# Patient Record
Sex: MALE | Race: WHITE | Employment: FULL TIME | URBAN - METROPOLITAN AREA
[De-identification: names, ages, dates, MRNs, and addresses within clinical notes are randomized per-mention and may not be internally consistent; named-entity substitution may affect disease eponyms.]

---

## 2019-04-10 ENCOUNTER — ANESTHESIA EVENT (OUTPATIENT)
Dept: OPERATING ROOM | Age: 49
DRG: 343 | End: 2019-04-10
Payer: COMMERCIAL

## 2019-04-10 ENCOUNTER — HOSPITAL ENCOUNTER (INPATIENT)
Age: 49
LOS: 1 days | Discharge: HOME | DRG: 343 | End: 2019-04-11
Attending: EMERGENCY MEDICINE | Admitting: SURGERY
Payer: COMMERCIAL

## 2019-04-10 ENCOUNTER — ANESTHESIA (OUTPATIENT)
Dept: OPERATING ROOM | Age: 49
DRG: 343 | End: 2019-04-10
Payer: COMMERCIAL

## 2019-04-10 ENCOUNTER — APPOINTMENT (OUTPATIENT)
Dept: CT IMAGING | Age: 49
DRG: 343 | End: 2019-04-10
Payer: COMMERCIAL

## 2019-04-10 VITALS
OXYGEN SATURATION: 83 % | DIASTOLIC BLOOD PRESSURE: 109 MMHG | SYSTOLIC BLOOD PRESSURE: 153 MMHG | TEMPERATURE: 97.9 F | RESPIRATION RATE: 6 BRPM

## 2019-04-10 DIAGNOSIS — K35.30 ACUTE APPENDICITIS WITH LOCALIZED PERITONITIS, WITHOUT PERFORATION OR ABSCESS, UNSPECIFIED WHETHER GANGRENE PRESENT: Primary | ICD-10-CM

## 2019-04-10 DIAGNOSIS — Z90.49 S/P LAPAROSCOPIC APPENDECTOMY: ICD-10-CM

## 2019-04-10 PROBLEM — K37 APPENDICITIS: Status: ACTIVE | Noted: 2019-04-10

## 2019-04-10 LAB
ALBUMIN SERPL-MCNC: 4.3 G/DL (ref 3.5–5.2)
ALP BLD-CCNC: 65 U/L (ref 40–129)
ALT SERPL-CCNC: 25 U/L (ref 0–40)
ANION GAP SERPL CALCULATED.3IONS-SCNC: 9 MMOL/L (ref 7–16)
AST SERPL-CCNC: 20 U/L (ref 0–39)
BASOPHILS ABSOLUTE: 0.06 E9/L (ref 0–0.2)
BASOPHILS RELATIVE PERCENT: 0.6 % (ref 0–2)
BILIRUB SERPL-MCNC: 0.9 MG/DL (ref 0–1.2)
BILIRUBIN URINE: NEGATIVE
BLOOD, URINE: NEGATIVE
BUN BLDV-MCNC: 6 MG/DL (ref 6–20)
CALCIUM SERPL-MCNC: 9.4 MG/DL (ref 8.6–10.2)
CHLORIDE BLD-SCNC: 105 MMOL/L (ref 98–107)
CLARITY: CLEAR
CO2: 24 MMOL/L (ref 22–29)
COLOR: YELLOW
CREAT SERPL-MCNC: 0.8 MG/DL (ref 0.7–1.2)
EOSINOPHILS ABSOLUTE: 0.3 E9/L (ref 0.05–0.5)
EOSINOPHILS RELATIVE PERCENT: 3.1 % (ref 0–6)
GFR AFRICAN AMERICAN: >60
GFR NON-AFRICAN AMERICAN: >60 ML/MIN/1.73
GLUCOSE BLD-MCNC: 106 MG/DL (ref 74–99)
GLUCOSE URINE: NEGATIVE MG/DL
HCT VFR BLD CALC: 47.6 % (ref 37–54)
HEMOGLOBIN: 16.5 G/DL (ref 12.5–16.5)
IMMATURE GRANULOCYTES #: 0.03 E9/L
IMMATURE GRANULOCYTES %: 0.3 % (ref 0–5)
KETONES, URINE: NEGATIVE MG/DL
LACTIC ACID: 1 MMOL/L (ref 0.5–2.2)
LEUKOCYTE ESTERASE, URINE: NEGATIVE
LIPASE: 21 U/L (ref 13–60)
LYMPHOCYTES ABSOLUTE: 1.78 E9/L (ref 1.5–4)
LYMPHOCYTES RELATIVE PERCENT: 18.3 % (ref 20–42)
MCH RBC QN AUTO: 30.9 PG (ref 26–35)
MCHC RBC AUTO-ENTMCNC: 34.7 % (ref 32–34.5)
MCV RBC AUTO: 89.1 FL (ref 80–99.9)
MONOCYTES ABSOLUTE: 0.59 E9/L (ref 0.1–0.95)
MONOCYTES RELATIVE PERCENT: 6.1 % (ref 2–12)
NEUTROPHILS ABSOLUTE: 6.99 E9/L (ref 1.8–7.3)
NEUTROPHILS RELATIVE PERCENT: 71.6 % (ref 43–80)
NITRITE, URINE: NEGATIVE
PDW BLD-RTO: 12.6 FL (ref 11.5–15)
PH UA: 8 (ref 5–9)
PLATELET # BLD: 193 E9/L (ref 130–450)
PMV BLD AUTO: 10.9 FL (ref 7–12)
POTASSIUM SERPL-SCNC: 4.3 MMOL/L (ref 3.5–5)
PROTEIN UA: NEGATIVE MG/DL
RBC # BLD: 5.34 E12/L (ref 3.8–5.8)
SODIUM BLD-SCNC: 138 MMOL/L (ref 132–146)
SPECIFIC GRAVITY UA: 1.01 (ref 1–1.03)
TOTAL PROTEIN: 7.5 G/DL (ref 6.4–8.3)
UROBILINOGEN, URINE: 0.2 E.U./DL
WBC # BLD: 9.8 E9/L (ref 4.5–11.5)

## 2019-04-10 PROCEDURE — 2709999900 HC NON-CHARGEABLE SUPPLY: Performed by: SURGERY

## 2019-04-10 PROCEDURE — 2580000003 HC RX 258: Performed by: STUDENT IN AN ORGANIZED HEALTH CARE EDUCATION/TRAINING PROGRAM

## 2019-04-10 PROCEDURE — 83605 ASSAY OF LACTIC ACID: CPT

## 2019-04-10 PROCEDURE — 7100000000 HC PACU RECOVERY - FIRST 15 MIN: Performed by: SURGERY

## 2019-04-10 PROCEDURE — 2580000003 HC RX 258: Performed by: PHYSICIAN ASSISTANT

## 2019-04-10 PROCEDURE — 2500000003 HC RX 250 WO HCPCS: Performed by: SURGERY

## 2019-04-10 PROCEDURE — 3600000013 HC SURGERY LEVEL 3 ADDTL 15MIN: Performed by: SURGERY

## 2019-04-10 PROCEDURE — 88304 TISSUE EXAM BY PATHOLOGIST: CPT

## 2019-04-10 PROCEDURE — 1200000000 HC SEMI PRIVATE

## 2019-04-10 PROCEDURE — 6360000002 HC RX W HCPCS: Performed by: PHYSICIAN ASSISTANT

## 2019-04-10 PROCEDURE — 44970 LAPAROSCOPY APPENDECTOMY: CPT | Performed by: SURGERY

## 2019-04-10 PROCEDURE — 83690 ASSAY OF LIPASE: CPT

## 2019-04-10 PROCEDURE — 81003 URINALYSIS AUTO W/O SCOPE: CPT

## 2019-04-10 PROCEDURE — 7100000001 HC PACU RECOVERY - ADDTL 15 MIN: Performed by: SURGERY

## 2019-04-10 PROCEDURE — 3700000001 HC ADD 15 MINUTES (ANESTHESIA): Performed by: SURGERY

## 2019-04-10 PROCEDURE — 2580000003 HC RX 258: Performed by: SURGERY

## 2019-04-10 PROCEDURE — 80053 COMPREHEN METABOLIC PANEL: CPT

## 2019-04-10 PROCEDURE — 0DTJ4ZZ RESECTION OF APPENDIX, PERCUTANEOUS ENDOSCOPIC APPROACH: ICD-10-PCS | Performed by: SURGERY

## 2019-04-10 PROCEDURE — 85025 COMPLETE CBC W/AUTO DIFF WBC: CPT

## 2019-04-10 PROCEDURE — 6360000002 HC RX W HCPCS: Performed by: STUDENT IN AN ORGANIZED HEALTH CARE EDUCATION/TRAINING PROGRAM

## 2019-04-10 PROCEDURE — 2500000003 HC RX 250 WO HCPCS: Performed by: NURSE ANESTHETIST, CERTIFIED REGISTERED

## 2019-04-10 PROCEDURE — 74177 CT ABD & PELVIS W/CONTRAST: CPT

## 2019-04-10 PROCEDURE — 2720000010 HC SURG SUPPLY STERILE: Performed by: SURGERY

## 2019-04-10 PROCEDURE — 6370000000 HC RX 637 (ALT 250 FOR IP): Performed by: STUDENT IN AN ORGANIZED HEALTH CARE EDUCATION/TRAINING PROGRAM

## 2019-04-10 PROCEDURE — 36415 COLL VENOUS BLD VENIPUNCTURE: CPT

## 2019-04-10 PROCEDURE — 6360000002 HC RX W HCPCS: Performed by: NURSE ANESTHETIST, CERTIFIED REGISTERED

## 2019-04-10 PROCEDURE — 3700000000 HC ANESTHESIA ATTENDED CARE: Performed by: SURGERY

## 2019-04-10 PROCEDURE — 3600000003 HC SURGERY LEVEL 3 BASE: Performed by: SURGERY

## 2019-04-10 PROCEDURE — 99223 1ST HOSP IP/OBS HIGH 75: CPT | Performed by: SURGERY

## 2019-04-10 PROCEDURE — 2580000003 HC RX 258: Performed by: RADIOLOGY

## 2019-04-10 PROCEDURE — 99285 EMERGENCY DEPT VISIT HI MDM: CPT

## 2019-04-10 PROCEDURE — 2500000003 HC RX 250 WO HCPCS: Performed by: PHYSICIAN ASSISTANT

## 2019-04-10 PROCEDURE — 6360000004 HC RX CONTRAST MEDICATION: Performed by: RADIOLOGY

## 2019-04-10 RX ORDER — CIPROFLOXACIN 2 MG/ML
400 INJECTION, SOLUTION INTRAVENOUS ONCE
Status: COMPLETED | OUTPATIENT
Start: 2019-04-10 | End: 2019-04-10

## 2019-04-10 RX ORDER — SODIUM CHLORIDE 0.9 % (FLUSH) 0.9 %
10 SYRINGE (ML) INJECTION PRN
Status: DISCONTINUED | OUTPATIENT
Start: 2019-04-10 | End: 2019-04-11 | Stop reason: HOSPADM

## 2019-04-10 RX ORDER — 0.9 % SODIUM CHLORIDE 0.9 %
1000 INTRAVENOUS SOLUTION INTRAVENOUS ONCE
Status: COMPLETED | OUTPATIENT
Start: 2019-04-10 | End: 2019-04-10

## 2019-04-10 RX ORDER — MIDAZOLAM HYDROCHLORIDE 1 MG/ML
INJECTION INTRAMUSCULAR; INTRAVENOUS PRN
Status: DISCONTINUED | OUTPATIENT
Start: 2019-04-10 | End: 2019-04-10 | Stop reason: SDUPTHER

## 2019-04-10 RX ORDER — SODIUM CHLORIDE 0.9 % (FLUSH) 0.9 %
10 SYRINGE (ML) INJECTION EVERY 12 HOURS SCHEDULED
Status: DISCONTINUED | OUTPATIENT
Start: 2019-04-10 | End: 2019-04-11 | Stop reason: HOSPADM

## 2019-04-10 RX ORDER — MORPHINE SULFATE 2 MG/ML
2 INJECTION, SOLUTION INTRAMUSCULAR; INTRAVENOUS EVERY 5 MIN PRN
Status: DISCONTINUED | OUTPATIENT
Start: 2019-04-10 | End: 2019-04-11 | Stop reason: HOSPADM

## 2019-04-10 RX ORDER — OXYCODONE HYDROCHLORIDE 5 MG/1
5 TABLET ORAL EVERY 4 HOURS PRN
Status: DISCONTINUED | OUTPATIENT
Start: 2019-04-10 | End: 2019-04-11 | Stop reason: HOSPADM

## 2019-04-10 RX ORDER — OXYCODONE HYDROCHLORIDE 10 MG/1
10 TABLET ORAL EVERY 4 HOURS PRN
Status: DISCONTINUED | OUTPATIENT
Start: 2019-04-10 | End: 2019-04-11 | Stop reason: HOSPADM

## 2019-04-10 RX ORDER — CIPROFLOXACIN 2 MG/ML
INJECTION, SOLUTION INTRAVENOUS PRN
Status: DISCONTINUED | OUTPATIENT
Start: 2019-04-10 | End: 2019-04-10 | Stop reason: SDUPTHER

## 2019-04-10 RX ORDER — FENTANYL CITRATE 50 UG/ML
INJECTION, SOLUTION INTRAMUSCULAR; INTRAVENOUS PRN
Status: DISCONTINUED | OUTPATIENT
Start: 2019-04-10 | End: 2019-04-10 | Stop reason: SDUPTHER

## 2019-04-10 RX ORDER — MEPERIDINE HYDROCHLORIDE 50 MG/ML
12.5 INJECTION INTRAMUSCULAR; INTRAVENOUS; SUBCUTANEOUS EVERY 5 MIN PRN
Status: DISCONTINUED | OUTPATIENT
Start: 2019-04-10 | End: 2019-04-11 | Stop reason: HOSPADM

## 2019-04-10 RX ORDER — ONDANSETRON 2 MG/ML
4 INJECTION INTRAMUSCULAR; INTRAVENOUS EVERY 6 HOURS PRN
Status: DISCONTINUED | OUTPATIENT
Start: 2019-04-10 | End: 2019-04-11 | Stop reason: HOSPADM

## 2019-04-10 RX ORDER — PROPOFOL 10 MG/ML
INJECTION, EMULSION INTRAVENOUS PRN
Status: DISCONTINUED | OUTPATIENT
Start: 2019-04-10 | End: 2019-04-10 | Stop reason: SDUPTHER

## 2019-04-10 RX ORDER — ALBUTEROL SULFATE 90 UG/1
2 AEROSOL, METERED RESPIRATORY (INHALATION) EVERY 6 HOURS PRN
COMMUNITY

## 2019-04-10 RX ORDER — ROCURONIUM BROMIDE 10 MG/ML
INJECTION, SOLUTION INTRAVENOUS PRN
Status: DISCONTINUED | OUTPATIENT
Start: 2019-04-10 | End: 2019-04-10 | Stop reason: SDUPTHER

## 2019-04-10 RX ORDER — DEXAMETHASONE SODIUM PHOSPHATE 10 MG/ML
INJECTION INTRAMUSCULAR; INTRAVENOUS PRN
Status: DISCONTINUED | OUTPATIENT
Start: 2019-04-10 | End: 2019-04-10 | Stop reason: SDUPTHER

## 2019-04-10 RX ORDER — ONDANSETRON 2 MG/ML
4 INJECTION INTRAMUSCULAR; INTRAVENOUS
Status: ACTIVE | OUTPATIENT
Start: 2019-04-10 | End: 2019-04-10

## 2019-04-10 RX ORDER — PANTOPRAZOLE SODIUM 40 MG/1
40 TABLET, DELAYED RELEASE ORAL DAILY
Status: DISCONTINUED | OUTPATIENT
Start: 2019-04-10 | End: 2019-04-11 | Stop reason: HOSPADM

## 2019-04-10 RX ORDER — CETIRIZINE HYDROCHLORIDE 10 MG/1
10 TABLET ORAL DAILY PRN
COMMUNITY

## 2019-04-10 RX ORDER — MORPHINE SULFATE 2 MG/ML
2 INJECTION, SOLUTION INTRAMUSCULAR; INTRAVENOUS
Status: DISCONTINUED | OUTPATIENT
Start: 2019-04-10 | End: 2019-04-11 | Stop reason: HOSPADM

## 2019-04-10 RX ORDER — ACETAMINOPHEN 325 MG/1
650 TABLET ORAL EVERY 4 HOURS PRN
Status: DISCONTINUED | OUTPATIENT
Start: 2019-04-10 | End: 2019-04-11 | Stop reason: HOSPADM

## 2019-04-10 RX ORDER — PROMETHAZINE HYDROCHLORIDE 25 MG/ML
6.25 INJECTION, SOLUTION INTRAMUSCULAR; INTRAVENOUS
Status: ACTIVE | OUTPATIENT
Start: 2019-04-10 | End: 2019-04-10

## 2019-04-10 RX ORDER — OXYCODONE HYDROCHLORIDE AND ACETAMINOPHEN 5; 325 MG/1; MG/1
1 TABLET ORAL EVERY 6 HOURS PRN
Qty: 12 TABLET | Refills: 0 | Status: SHIPPED | OUTPATIENT
Start: 2019-04-10 | End: 2019-04-13

## 2019-04-10 RX ORDER — SODIUM CHLORIDE, SODIUM LACTATE, POTASSIUM CHLORIDE, CALCIUM CHLORIDE 600; 310; 30; 20 MG/100ML; MG/100ML; MG/100ML; MG/100ML
INJECTION, SOLUTION INTRAVENOUS CONTINUOUS
Status: DISCONTINUED | OUTPATIENT
Start: 2019-04-10 | End: 2019-04-11

## 2019-04-10 RX ORDER — SODIUM CHLORIDE 0.9 % (FLUSH) 0.9 %
10 SYRINGE (ML) INJECTION
Status: COMPLETED | OUTPATIENT
Start: 2019-04-10 | End: 2019-04-10

## 2019-04-10 RX ORDER — FLUTICASONE PROPIONATE 50 MCG
1 SPRAY, SUSPENSION (ML) NASAL DAILY PRN
COMMUNITY

## 2019-04-10 RX ADMIN — METRONIDAZOLE 500 MG: 500 INJECTION, SOLUTION INTRAVENOUS at 11:37

## 2019-04-10 RX ADMIN — OXYCODONE HYDROCHLORIDE 10 MG: 10 TABLET ORAL at 20:18

## 2019-04-10 RX ADMIN — FENTANYL CITRATE 50 MCG: 50 INJECTION, SOLUTION INTRAMUSCULAR; INTRAVENOUS at 15:55

## 2019-04-10 RX ADMIN — ROCURONIUM BROMIDE 30 MG: 10 INJECTION, SOLUTION INTRAVENOUS at 14:51

## 2019-04-10 RX ADMIN — SODIUM CHLORIDE 1000 ML: 9 INJECTION, SOLUTION INTRAVENOUS at 08:15

## 2019-04-10 RX ADMIN — METRONIDAZOLE 500 MG: 500 INJECTION, SOLUTION INTRAVENOUS at 14:58

## 2019-04-10 RX ADMIN — CIPROFLOXACIN 400 MG: 2 INJECTION, SOLUTION INTRAVENOUS at 15:07

## 2019-04-10 RX ADMIN — LIDOCAINE HYDROCHLORIDE 60 MG: 20 INJECTION, SOLUTION INTRAVENOUS at 14:51

## 2019-04-10 RX ADMIN — IOPAMIDOL 110 ML: 755 INJECTION, SOLUTION INTRAVENOUS at 09:23

## 2019-04-10 RX ADMIN — DEXAMETHASONE SODIUM PHOSPHATE 10 MG: 10 INJECTION INTRAMUSCULAR; INTRAVENOUS at 15:10

## 2019-04-10 RX ADMIN — FENTANYL CITRATE 100 MCG: 50 INJECTION, SOLUTION INTRAMUSCULAR; INTRAVENOUS at 14:51

## 2019-04-10 RX ADMIN — FENTANYL CITRATE 50 MCG: 50 INJECTION, SOLUTION INTRAMUSCULAR; INTRAVENOUS at 15:57

## 2019-04-10 RX ADMIN — FENTANYL CITRATE 50 MCG: 50 INJECTION, SOLUTION INTRAMUSCULAR; INTRAVENOUS at 15:09

## 2019-04-10 RX ADMIN — ENOXAPARIN SODIUM 40 MG: 40 INJECTION SUBCUTANEOUS at 20:18

## 2019-04-10 RX ADMIN — ROCURONIUM BROMIDE 10 MG: 10 INJECTION, SOLUTION INTRAVENOUS at 15:10

## 2019-04-10 RX ADMIN — MIDAZOLAM HYDROCHLORIDE 2 MG: 1 INJECTION, SOLUTION INTRAMUSCULAR; INTRAVENOUS at 14:42

## 2019-04-10 RX ADMIN — Medication 10 ML: at 09:24

## 2019-04-10 RX ADMIN — SODIUM CHLORIDE, POTASSIUM CHLORIDE, SODIUM LACTATE AND CALCIUM CHLORIDE: 600; 310; 30; 20 INJECTION, SOLUTION INTRAVENOUS at 20:18

## 2019-04-10 RX ADMIN — Medication 10 ML: at 20:18

## 2019-04-10 RX ADMIN — CIPROFLOXACIN 400 MG: 2 INJECTION, SOLUTION INTRAVENOUS at 11:37

## 2019-04-10 RX ADMIN — SODIUM CHLORIDE, POTASSIUM CHLORIDE, SODIUM LACTATE AND CALCIUM CHLORIDE: 600; 310; 30; 20 INJECTION, SOLUTION INTRAVENOUS at 14:42

## 2019-04-10 RX ADMIN — PROPOFOL 200 MG: 10 INJECTION, EMULSION INTRAVENOUS at 14:51

## 2019-04-10 ASSESSMENT — PULMONARY FUNCTION TESTS
PIF_VALUE: 29
PIF_VALUE: 32
PIF_VALUE: 26
PIF_VALUE: 29
PIF_VALUE: 29
PIF_VALUE: 32
PIF_VALUE: 32
PIF_VALUE: 4
PIF_VALUE: 30
PIF_VALUE: 29
PIF_VALUE: 27
PIF_VALUE: 25
PIF_VALUE: 24
PIF_VALUE: 26
PIF_VALUE: 1
PIF_VALUE: 31
PIF_VALUE: 1
PIF_VALUE: 22
PIF_VALUE: 26
PIF_VALUE: 25
PIF_VALUE: 26
PIF_VALUE: 22
PIF_VALUE: 26
PIF_VALUE: 0
PIF_VALUE: 25
PIF_VALUE: 5
PIF_VALUE: 26
PIF_VALUE: 1
PIF_VALUE: 26
PIF_VALUE: 29
PIF_VALUE: 32
PIF_VALUE: 0
PIF_VALUE: 27
PIF_VALUE: 23
PIF_VALUE: 26
PIF_VALUE: 25
PIF_VALUE: 1
PIF_VALUE: 32
PIF_VALUE: 27
PIF_VALUE: 0
PIF_VALUE: 2
PIF_VALUE: 32
PIF_VALUE: 26
PIF_VALUE: 0
PIF_VALUE: 32
PIF_VALUE: 32
PIF_VALUE: 6
PIF_VALUE: 26
PIF_VALUE: 32
PIF_VALUE: 32
PIF_VALUE: 25
PIF_VALUE: 7
PIF_VALUE: 1
PIF_VALUE: 26
PIF_VALUE: 24
PIF_VALUE: 24
PIF_VALUE: 29
PIF_VALUE: 0
PIF_VALUE: 24
PIF_VALUE: 32
PIF_VALUE: 24
PIF_VALUE: 24
PIF_VALUE: 32
PIF_VALUE: 26
PIF_VALUE: 28
PIF_VALUE: 1
PIF_VALUE: 32
PIF_VALUE: 24

## 2019-04-10 ASSESSMENT — PAIN DESCRIPTION - PAIN TYPE
TYPE: SURGICAL PAIN
TYPE: ACUTE PAIN

## 2019-04-10 ASSESSMENT — PAIN DESCRIPTION - LOCATION
LOCATION: ABDOMEN
LOCATION: ABDOMEN

## 2019-04-10 ASSESSMENT — LIFESTYLE VARIABLES: SMOKING_STATUS: 1

## 2019-04-10 ASSESSMENT — PAIN SCALES - GENERAL
PAINLEVEL_OUTOF10: 0
PAINLEVEL_OUTOF10: 10
PAINLEVEL_OUTOF10: 7

## 2019-04-10 ASSESSMENT — PAIN DESCRIPTION - DESCRIPTORS: DESCRIPTORS: ACHING;SHARP

## 2019-04-10 ASSESSMENT — PAIN DESCRIPTION - ORIENTATION: ORIENTATION: RIGHT;LOWER

## 2019-04-10 NOTE — ANESTHESIA PRE PROCEDURE
Department of Anesthesiology  Preprocedure Note       Name:  Sg Trujillo   Age:  53 y.o.  :  1970                                          MRN:  [id number]         Date:  4/10/2019      Surgeon: Ale Bahena):  Wilbert North MD    Procedure: LAPAROSCOPIC APPENDECTOMY (N/A )    Medications prior to admission:   Prior to Admission medications    Medication Sig Start Date End Date Taking? Authorizing Provider   albuterol sulfate  (90 Base) MCG/ACT inhaler Inhale 2 puffs into the lungs every 6 hours as needed for Wheezing   Yes Historical Provider, MD   fluticasone (FLONASE) 50 MCG/ACT nasal spray 1 spray by Each Nare route daily as needed for Rhinitis   Yes Historical Provider, MD   cetirizine (ZYRTEC) 10 MG tablet Take 10 mg by mouth daily as needed for Allergies   Yes Historical Provider, MD       Current medications:    Current Facility-Administered Medications   Medication Dose Route Frequency Provider Last Rate Last Dose    sodium chloride flush 0.9 % injection 10 mL  10 mL Intravenous 2 times per day Hosea Hunter MD        sodium chloride flush 0.9 % injection 10 mL  10 mL Intravenous PRN Hosea Hunter MD        acetaminophen (TYLENOL) tablet 650 mg  650 mg Oral Q4H PRN Hosea Hunter MD        enoxaparin (LOVENOX) injection 40 mg  40 mg Subcutaneous Daily Hosea Hunter MD        lactated ringers infusion   Intravenous Continuous Hosea Hunter MD        oxyCODONE (ROXICODONE) immediate release tablet 5 mg  5 mg Oral Q4H PRN Hosea Hunter MD        Or   [facility] oxyCODONE HCl (OXY-IR) immediate release tablet 10 mg  10 mg Oral Q4H PRN Hosea Hunter MD        morphine (PF) injection 2 mg  2 mg Intravenous Q3H PRN Hosea Hunter MD        pantoprazole (PROTONIX) tablet 40 mg  40 mg Oral Daily Hosea Hunter MD        ondansetron [facility]) injection 4 mg  4 mg Intravenous Q6H PRN Hosea Hunter MD           Allergies:     Allergies   Allergen Reactions    Other      Seafood    Penicillins        Problem List:    Patient Active Problem List   Diagnosis Code    Syncope R55    Appendicitis K37       Past Medical History:        Diagnosis Date    Hypotension     Syncope and collapse        Past Surgical History:        Procedure Laterality Date    BACK SURGERY      HERNIA REPAIR      OTHER SURGICAL HISTORY  04/06/2012    Dr Pablo Victor:  Tilt table test.       Social History:    Social History     Tobacco Use    Smoking status: Current Every Day Smoker     Packs/day: 0.50   Substance Use Topics    Alcohol use: Yes     Comment: socially                                Ready to quit: Not Answered  Counseling given: Not Answered      Vital Signs (Current):   Vitals:    04/10/19 0755 04/10/19 1000 04/10/19 1207   BP: 120/84 113/75 122/74   Pulse: 75 72 70   Resp: 16 14 12   Temp: 36.4 °C (97.6 °F)  36.9 °C (98.5 °F)   TempSrc: Temporal     SpO2: 96%  98%   Weight: 250 lb (113.4 kg)     Height: 6' 6\" (1.981 m)                                                BP Readings from Last 3 Encounters:   04/10/19 122/74   03/30/12 110/72       NPO Status: Time of last liquid consumption: 1700                        Time of last solid consumption: 1700                        Date of last liquid consumption: 04/09/19                        Date of last solid food consumption: 04/09/19    BMI:   Wt Readings from Last 3 Encounters:   04/10/19 250 lb (113.4 kg)   04/06/12 230 lb (104.3 kg)     Body mass index is 28.89 kg/m².     CBC:   Lab Results   Component Value Date    WBC 9.8 04/10/2019    RBC 5.34 04/10/2019    HGB 16.5 04/10/2019    HCT 47.6 04/10/2019    MCV 89.1 04/10/2019    RDW 12.6 04/10/2019     04/10/2019       CMP:   Lab Results   Component Value Date     04/10/2019    K 4.3 04/10/2019     04/10/2019    CO2 24 04/10/2019    BUN 6 04/10/2019    CREATININE 0.8 04/10/2019    GFRAA >60 04/10/2019    LABGLOM >60 04/10/2019    GLUCOSE 106 04/10/2019    PROT 7.5 04/10/2019    CALCIUM 9.4 04/10/2019    BILITOT 0.9 04/10/2019    ALKPHOS 65 04/10/2019    AST 20 04/10/2019    ALT 25 04/10/2019       POC Tests: No results for input(s): POCGLU, POCNA, POCK, POCCL, POCBUN, POCHEMO, POCHCT in the last 72 hours. Coags: No results found for: PROTIME, INR, APTT    HCG (If Applicable): No results found for: PREGTESTUR, PREGSERUM, HCG, HCGQUANT     ABGs: No results found for: PHART, PO2ART, TWP6ZOH, OEP5IMG, BEART, D5LGGMFL     Type & Screen (If Applicable):  No results found for: LABABO, LABRH     EKG 3-  Sinus  Bradycardia   WITHIN NORMAL LIMITS    Anesthesia Evaluation  Patient summary reviewed and Nursing notes reviewed no history of anesthetic complications:   Airway: Mallampati: II  TM distance: >3 FB   Neck ROM: full  Mouth opening: > = 3 FB Dental: normal exam         Pulmonary: breath sounds clear to auscultation  (+) asthma: seasonal asthma, current smoker                           Cardiovascular:            Rhythm: regular  Rate: normal                 ROS comment: Hypotension      Neuro/Psych:   Negative Neuro/Psych ROS              GI/Hepatic/Renal:   (+) GERD: well controlled,           Endo/Other: Negative Endo/Other ROS                    Abdominal:           Vascular: negative vascular ROS. Anesthesia Plan      general     ASA 2       Induction: intravenous. MIPS: Postoperative opioids intended and Prophylactic antiemetics administered. Anesthetic plan and risks discussed with patient. Use of blood products discussed with patient whom consented to blood products. Plan discussed with CRNA and attending.                   Araceli Joseph RN   4/10/2019

## 2019-04-10 NOTE — H&P
alert, oriented, in no acute distress  Skin:  Skin color, texture, turgor normal. No rashes or lesions. Head/face:  NCAT  Eyes:  No gross abnormalities. Lungs:  Normal expansion. Heart:  Heart regular rate   Abdomen:  Soft, RLQ tenderness, + psoas sign, + Rovsing sign       LABS:  CBC  Recent Labs     04/10/19  0817   WBC 9.8   HGB 16.5   HCT 47.6        BMP  Recent Labs     04/10/19  0817      K 4.3      CO2 24   BUN 6   CREATININE 0.8   CALCIUM 9.4     Liver Function  Recent Labs     04/10/19  0817   LIPASE 21   BILITOT 0.9   AST 20   ALT 25   ALKPHOS 65   PROT 7.5   LABALBU 4.3     Lab Results   Component Value Date    LACTA 1.0 04/10/2019         No results for input(s): INR, PTT in the last 72 hours. Invalid input(s): PT    RADIOLOGY    Ct Abdomen Pelvis W Iv Contrast Additional Contrast? None    Result Date: 4/10/2019  Patient MRN:  [id number] : 1970 Age: 53 years Gender: Male Order Date:  4/10/2019 8:15 AM EXAM: CT ABDOMEN PELVIS W IV CONTRAST Dosage: 1484.6 mGY-cm Contrast: 110 mL Isovue-370 INDICATION:  RLQ pain  COMPARISON: None FINDINGS:  Lung bases are unremarkable. Liver, spleen, pancreas are normal. There is a small cyst in the right kidney. Kidneys are otherwise unremarkable. No abdominal or pelvic lymphadenopathy or fluid collections are noted. There is some infiltration in the fat along the tip of the appendix. It is mildly dilated There is some wall thickening and diverticula in the distal descending colon and proximal sigmoid. No acute osseous abnormality     Findings compatible with acute appendicitis. Moderate distal descending colon and proximal sigmoid diverticulosis without diverticulitis         ASSESSMENT:  53 y.o. male with acute appendicitis    PLAN:  1. NPO  2. IVF  3. Analgesia   4. Am labs   5. Antibiotics: per ID due to anaphylaxis to penicillins  6. Laparoscopic appendectomy possible open  7.  GI/DVT prophylaxis: PPI & PCD's/ambulation     I explained to the patient the natural history of appendicitis. I discussed the treatment options of surgery vs. Antibiotics. I reviewed my standard operative approach of a laparoscopic appendectomy. The risks of surgery including infection, bleeding, injury to other abdominal structures, acute cardiopulmonary complications, and death were reviewed. The most common complication is abscess formation, which occurs in up to 10% of cases, and may require additional antibiotics as well as interventional radiology drainage. I also discussed with the patient that there is a possibility of conversion to open surgery, and that this may occur in 5% of cases. The patient is aware that prior to entering the abdomen, I will not know if I need to convert to an open approach. I recommended proceeding with laparoscopic appendectomy and answered the patient's questions. The patient understands the risks and benefits and agrees to proceed with surgery.        Electronically signed by Corby Brewster MD on 4/10/19 at 10:45 AM

## 2019-04-10 NOTE — CONSULTS
Consults     Department of Internal Medicine  Infectious Diseases   Consult Note      Reason for Consult:  Acute appendicitis       Requesting Physician:  Dr Darryn Guaman:                The patient is a 53 y.o. male presented to the ER < 24 hrs of right lower abdomen pain , some nausea . He reports pain started around the periumbilical area. He denies fever, chills or rigors, denies diarrhea . WBC 9.8 . CT abdomen and pelvis showed acute appendicitis . He was given ciprofloxacin and flagyl. He underwent lap appendectomy .      Past Medical History:    Past Medical History:   Diagnosis Date    Hypotension     Syncope and collapse          Past Surgical History:      Past Surgical History:   Procedure Laterality Date    BACK SURGERY      HERNIA REPAIR      OTHER SURGICAL HISTORY  04/06/2012    Dr Greg Potts:  Tilt table test.       Current Medications:      Current Facility-Administered Medications   Medication Dose Route Frequency Provider Last Rate Last Dose    sodium chloride flush 0.9 % injection 10 mL  10 mL Intravenous 2 times per day Nanda Mcfadden MD        sodium chloride flush 0.9 % injection 10 mL  10 mL Intravenous PRN Nanda Mcfadden MD        acetaminophen (TYLENOL) tablet 650 mg  650 mg Oral Q4H PRN Nanda Mcfadden MD        enoxaparin (LOVENOX) injection 40 mg  40 mg Subcutaneous Daily Nanda Mcfadden MD        lactated ringers infusion   Intravenous Continuous Catherne Primrose, MD   Stopped at 04/10/19 1556    oxyCODONE (ROXICODONE) immediate release tablet 5 mg  5 mg Oral Q4H PRN Nanda Mcfadden MD        Or   [facility] oxyCODONE HCl (OXY-IR) immediate release tablet 10 mg  10 mg Oral Q4H PRN Nanda Mcfadden MD        morphine (PF) injection 2 mg  2 mg Intravenous Q3H PRN Nanda Mcfadden MD        pantoprazole (PROTONIX) tablet 40 mg  40 mg Oral Daily Nanda Mcfadden MD        ondansetron [facility]) injection 4 mg  4 mg Intravenous Q6H PRN Nanda Mcfadden MD        morphine (PF) injection 2 mg  2 mg Intravenous Q5 Min PRN Cristobal Leung MD        HYDROmorphone (DILAUDID) injection 0.5 mg  0.5 mg Intravenous Q5 Min PRN Cristobal Leung MD        ondansetron [facility]) injection 4 mg  4 mg Intravenous Once PRN Cristobal Leung MD        promethazine [facility]) injection 6.25 mg  6.25 mg Intramuscular Once PRN Cristobal Leung MD        meperidine (DEMEROL) injection 12.5 mg  12.5 mg Intravenous Q5 Min PRN Cristobal Leung MD           Allergies:   Other and Penicillins    Social History:      Social History     Socioeconomic History    Marital status:      Spouse name: Not on file    Number of children: Not on file    Years of education: Not on file    Highest education level: Not on file   Occupational History    Not on file   Social Needs    Financial resource strain: Not on file    Food insecurity:     Worry: Not on file     Inability: Not on file    Transportation needs:     Medical: Not on file     Non-medical: Not on file   Tobacco Use    Smoking status: Current Every Day Smoker     Packs/day: 0.50   Substance and Sexual Activity    Alcohol use: Yes     Comment: socially    Drug use: No    Sexual activity: Not on file   Lifestyle    Physical activity:     Days per week: Not on file     Minutes per session: Not on file    Stress: Not on file   Relationships    Social connections:     Talks on phone: Not on file     Gets together: Not on file     Attends Islam service: Not on file     Active member of club or organization: Not on file     Attends meetings of clubs or organizations: Not on file     Relationship status: Not on file    Intimate partner violence:     Fear of current or ex partner: Not on file     Emotionally abused: Not on file     Physically abused: Not on file     Forced sexual activity: Not on file   Other Topics Concern    Not on file   Social History Narrative    Not on file         Family History:     Not pertinent to present illness    REVIEW OF SYSTEMS: CONSTITUTIONAL:  Denies fever, chill or rigors, nausea or vomiting. HEENT: denies blurring of vision or double vision, denies hearing problem  RESPIRATORY: denies cough, shortness of breath, sputum expectoration, chest pain. CARDIOVASCULAR:  Denies palpitation  GASTROINTESTINAL: abdomen pain   GENITOURINARY:  Denies burning urination or frequency of urination  INTEGUMENT: denies wound , rash  HEMATOLOGIC/LYMPHATIC:  Denies lymph node swelling, gum bleeding or easy bruising. MUSCULOSKELETAL:  Denies leg pain , joint pain , joint swelling  NEUROLOGICAL:  Denies light headed, dizziness, loss of consciousness, weakness of lower extremities, bowel or bladder incontinence. PHYSICAL EXAM:      Vitals:     Vitals:    04/10/19 1644   BP: 123/84   Pulse: 69   Resp: 20   Temp: 98.2 °F (36.8 °C)   SpO2: 94%       General Appearance:    Awake, alert , no acute distress. Head:    Normocephalic, atraumatic   Eyes:    No pallor, no icterus,   Ears:    No obvious deformity or drainage.    Nose:   No nasal drainage   Throat:   Mucosa moist, no oral thrush   Neck:   Supple, no lymphadenopathy   Back:     no CVA tenderness   Lungs:     Clear to auscultation bilaterally, no wheeze    Heart:    Regular rate and rhythm, no murmur, rub or gallop   Abdomen:     Soft,+ tender lower abdomen , bowel sounds present    Extremities:   No edema, no cyanosis ,no open wound,no erythema, no     tenderness   Pulses:   Dorsalis pedis palpable    Skin:   no rashes or lesions     CBC with Differential:      Lab Results   Component Value Date    WBC 9.8 04/10/2019    RBC 5.34 04/10/2019    HGB 16.5 04/10/2019    HCT 47.6 04/10/2019     04/10/2019    MCV 89.1 04/10/2019    MCH 30.9 04/10/2019    MCHC 34.7 04/10/2019    RDW 12.6 04/10/2019    LYMPHOPCT 18.3 04/10/2019    MONOPCT 6.1 04/10/2019    BASOPCT 0.6 04/10/2019    MONOSABS 0.59 04/10/2019    LYMPHSABS 1.78 04/10/2019    EOSABS 0.30 04/10/2019    BASOSABS 0.06 04/10/2019 CMP     Lab Results   Component Value Date     04/10/2019    K 4.3 04/10/2019     04/10/2019    CO2 24 04/10/2019    BUN 6 04/10/2019    CREATININE 0.8 04/10/2019    GFRAA >60 04/10/2019    LABGLOM >60 04/10/2019    GLUCOSE 106 04/10/2019    PROT 7.5 04/10/2019    LABALBU 4.3 04/10/2019    CALCIUM 9.4 04/10/2019    BILITOT 0.9 04/10/2019    ALKPHOS 65 04/10/2019    AST 20 04/10/2019    ALT 25 04/10/2019         Hepatic Function Panel:    Lab Results   Component Value Date    ALKPHOS 65 04/10/2019    ALT 25 04/10/2019    AST 20 04/10/2019    PROT 7.5 04/10/2019    BILITOT 0.9 04/10/2019    LABALBU 4.3 04/10/2019       PT/INR:  No results found for: PROTIME, INR    TSH:  No results found for: TSH      IMPRESSION:     1. Acute appendicitis s/p lap appendectomy       RECOMMENDATIONS:      1.  No further antibiotics ( received cipro and flagyl )    Thank you Dr Bala Rosales for the consult

## 2019-04-10 NOTE — OP NOTE
General Surgery Operative Report     Date of Procedure: 4/10/19    Ivory Kraft  YOB: 1970  [id number]    PREOPERATIVE DIAGNOSIS: Acute appendicitis. POSTOPERATIVE DIAGNOSIS: Acute appendicitis. OPERATION: Laparoscopic appendectomy     ANESTHESIA: General endotracheal anesthesia. SURGEON: Claus Travis MD    Assistant: Trae Ugarte MD PGY 4    Estimated Blood Loss: Minimal    Complications: none    Specimens: appendix    INDICATIONS FOR PROCEDURE:  Yovanny Marshall is a 53 y.o. male who presented with RLQ pain. He was diagnosed with acute appendicitis. Laparoscopic appendectomy was recommended to the patient. The risks, benefits, and alternatives were discussed. He stated his understanding and agreed to proceed. PROCEDURE: The patient was brought to the operating room and positioned supine on the OR table. Sequential compression devices were placed on the patient's lower extremities and functioning. Preoperative antibiotics were administered. Anesthesia was obtained without complication as per the anesthesia record. Immediately prior to the procedure a time-out was called and the surgical checklist was reviewed and agreed upon by all present. The patient was prepped and draped in the usual sterile fashion and the procedure went forth with strict aseptic technique under maximal barrier precautions. Using an 11 blade, a 5 mm supraumbilical incision was made. The Veress needle was inserted through the incision into the peritoneal cavity and placement of the Veress needle was confirmed with a saline drop test. A 5 mm trocar was inserted into the peritoneal cavity under direct visualization. Next a 12 mm port was placed in the left lower quadrant and a 5 mm port was placed in the midline suprapubic area. The abdomen was inspected and the appendix was identified.  It was inflammed consistent with appendicitis with gangrene of the tip    The appendix was grasped and elevated with an atraumatic grasper. Using a [place], a window was made in the mesentery at the base of the appendix. The appendix was transected at the level of the cecum using a ROBER 35 mm blue load. The mesoappendix was the transected along its length using the ROBER 35mm white load, freeing the appendix from its remaining attachments. The appendix was placed in and Endocatch bag and removed from the 10 mm port site. The staple line was inspected and noted to be intact. The mesentery staple line was covered with surgical snow. There was no evidence of bleeding from the staple line or the mesoappendix. The 10 mm port site was then closed using an interrupted Vicryl suture placed with the Ghanshyam-Sanjeev device. The remaining ports were removed under direct visualization and the skin was then closed with 4-0 Monocryl sutures. Dermaflex was applied to the incisions. Needle, sponge, and instrument counts were reported as correct x2. The patient tolerated the procedure well without complications. Dr. Lavinia Lipscomb was present and scrubbed throughout the case. DISPOSITION: Anesthesia was discontinued and the patient was extubated prior to leaving the OR. He was transferred to the recovery area in good condition.      Aubrey Castano MD  General Surgery, PGY-4

## 2019-04-10 NOTE — ED PROVIDER NOTES
ED Attending  CC: Jennifer     Department of Emergency Medicine   ED  Provider Note  Admit Date/RoomTime: 4/10/2019  8:02 AM  ED Room: 01/01  Chief Complaint     Abdominal Pain (RLQ ABD PAIN SINCE YESTERDAY; + NAUSEA, DENIES EMESIS/DIARRHEA)    History of Present Illness   Source of history provided by:  patient. History/Exam Limitations: none. Thomas Bueno is a 53 y.o. old male who has a past medical history of:   Past Medical History:   Diagnosis Date    Hypotension     Syncope and collapse     presents to the emergency department by private vehicle and ambulatory, for complaints of gradual onset aching, cramping pain in the RLQ without radiation which began 1 day(s) prior to arrival.  There has been no similar episodes in the past.   Since onset the symptoms have been persistent. The pain is associated with nausea. The pain is aggravated by none and relieved by nothing. There has been NO back pain, constipation, diarrhea, dysuria or vomiting. Patient states yesterday he had diffuse abdominal pain. States today it localized to the right lower quadrant. Recent episode of diverticulitis. Completed round of antibiotics 1 week ago. Denies any vomiting, chest pain, urinary symptoms or history of similar episodes. .  ROS   Pertinent positives and negatives are stated within HPI, all other systems reviewed and are negative. Past Surgical History:   Procedure Laterality Date    BACK SURGERY      HERNIA REPAIR      OTHER SURGICAL HISTORY  04/06/2012    Dr Pablo Victor:  Tilt table test.   Social History:  reports that he has been smoking. He has been smoking about 0.50 packs per day. He does not have any smokeless tobacco history on file. He reports that he drinks alcohol. He reports that he does not use drugs. Family History: family history is not on file. Allergies:  Other and Penicillins    Physical Exam           ED Triage Vitals [04/10/19 0755]   BP Temp Temp Source Pulse Resp SpO2 Height Weight   120/84 97.6 °F (36.4 °C) Temporal 75 16 96 % 6' 6\" (1.981 m) 250 lb (113.4 kg)      Oxygen Saturation Interpretation: Normal.    · General Appearance/Constitutional:  Alert, development consistent with age. · HEENT:  NC/NT. PERRLA. Airway patent. · Neck:  Supple. No lymphadenopathy. · Respiratory: Lungs Clear to auscultation and breath sounds equal.  · CV:  Regular rate and rhythm. · GI:  General Appearance: normal.         Bowel sounds: normal bowel sounds. Distension:  None. Tenderness: moderate tenderness is present in the RLQ.  +Rovisng's sign         Liver: non-tender. Spleen:  non-tender. Pulsatile Mass: absent. Hernia:  no inguinal or femoral hernias noted. · Back: CVA Tenderness: No.  · Integument:  Normal turgor. Warm, dry, without visible rash, unless noted elsewhere. · Neurological:  Orientation age-appropriate. Motor functions intact.     Lab / Imaging Results   (All laboratory and radiology results have been personally reviewed by myself)  Labs:  Results for orders placed or performed during the hospital encounter of 04/10/19   CBC Auto Differential   Result Value Ref Range    WBC 9.8 4.5 - 11.5 E9/L    RBC 5.34 3.80 - 5.80 E12/L    Hemoglobin 16.5 12.5 - 16.5 g/dL    Hematocrit 47.6 37.0 - 54.0 %    MCV 89.1 80.0 - 99.9 fL    MCH 30.9 26.0 - 35.0 pg    MCHC 34.7 (H) 32.0 - 34.5 %    RDW 12.6 11.5 - 15.0 fL    Platelets 366 838 - 692 E9/L    MPV 10.9 7.0 - 12.0 fL    Neutrophils % 71.6 43.0 - 80.0 %    Immature Granulocytes % 0.3 0.0 - 5.0 %    Lymphocytes % 18.3 (L) 20.0 - 42.0 %    Monocytes % 6.1 2.0 - 12.0 %    Eosinophils % 3.1 0.0 - 6.0 %    Basophils % 0.6 0.0 - 2.0 %    Neutrophils # 6.99 1.80 - 7.30 E9/L    Immature Granulocytes # 0.03 E9/L    Lymphocytes # 1.78 1.50 - 4.00 E9/L    Monocytes # 0.59 0.10 - 0.95 E9/L    Eosinophils # 0.30 0.05 - 0.50 E9/L    Basophils # 0.06 0.00 - 0.20 E9/L   Comprehensive Metabolic Panel Result Value Ref Range    Sodium 138 132 - 146 mmol/L    Potassium 4.3 3.5 - 5.0 mmol/L    Chloride 105 98 - 107 mmol/L    CO2 24 22 - 29 mmol/L    Anion Gap 9 7 - 16 mmol/L    Glucose 106 (H) 74 - 99 mg/dL    BUN 6 6 - 20 mg/dL    CREATININE 0.8 0.7 - 1.2 mg/dL    GFR Non-African American >60 >=60 mL/min/1.73    GFR African American >60     Calcium 9.4 8.6 - 10.2 mg/dL    Total Protein 7.5 6.4 - 8.3 g/dL    Alb 4.3 3.5 - 5.2 g/dL    Total Bilirubin 0.9 0.0 - 1.2 mg/dL    Alkaline Phosphatase 65 40 - 129 U/L    ALT 25 0 - 40 U/L    AST 20 0 - 39 U/L   Lactic Acid, Plasma   Result Value Ref Range    Lactic Acid 1.0 0.5 - 2.2 mmol/L   Lipase   Result Value Ref Range    Lipase 21 13 - 60 U/L   Urinalysis   Result Value Ref Range    Color, UA Yellow Straw/Yellow    Clarity, UA Clear Clear    Glucose, Ur Negative Negative mg/dL    Bilirubin Urine Negative Negative    Ketones, Urine Negative Negative mg/dL    Specific Gravity, UA 1.010 1.005 - 1.030    Blood, Urine Negative Negative    pH, UA 8.0 5.0 - 9.0    Protein, UA Negative Negative mg/dL    Urobilinogen, Urine 0.2 <2.0 E.U./dL    Nitrite, Urine Negative Negative    Leukocyte Esterase, Urine Negative Negative     Imaging: All Radiology results interpreted by Radiologist unless otherwise noted. CT ABDOMEN PELVIS W IV CONTRAST Additional Contrast? None   Final Result   Findings compatible with acute appendicitis.       Moderate distal descending colon and proximal sigmoid diverticulosis   without diverticulitis                   ED Course / Medical Decision Making     Medications   ciprofloxacin (CIPRO) IVPB 400 mg (400 mg Intravenous New Bag 4/10/19 1137)   metronidazole (FLAGYL) 500 mg in NaCl 100 mL IVPB premix (500 mg Intravenous New Bag 4/10/19 1137)   0.9 % sodium chloride bolus (0 mLs Intravenous Stopped 4/10/19 0900)   iopamidol (ISOVUE-370) 76 % injection 110 mL (110 mLs Intravenous Given 4/10/19 0923)   sodium chloride flush 0.9 % injection 10 mL (10 mLs Intravenous Given 4/10/19 0924)     ED Course as of Apr 10 1204   Wed Apr 10, 2019   1016 Patient resting comfortably in bed. Discussed CT scan results. Will consult general surgery. [CM]      ED Course User Index  [CM] Yanely Viramontes       Consultations:             IP CONSULT TO GENERAL SURGERY  IP CONSULT TO INFECTIOUS DISEASES    Procedures:   none    MDM:  Lab work and imaging obtained. Patient declines pain medication. Lab work stable. CT scan of the abdomen shows signs of acute appendicitis. Discussed case with general surgery. They will evaluate the patient in the emergency department. Dr. Ginny Stein evaluated the patient. Patient states that he is anaphylactic reactions to penicillin as well as alternatives. Initially consulted ID for antibiotic selection. General surgery states if ID does not respond Cipro and Flagyl can be given. Disposition per general surgery. Counseling:   I have spoken with the patient and discussed todays results, in addition to providing specific details for the plan of care and counseling regarding the diagnosis and prognosis and are agreeable with the plan. Assessment      1. Acute appendicitis with localized peritonitis, without perforation or abscess, unspecified whether gangrene present      This patient's ED course included: a personal history and physicial examination  This patient has been closely monitored during their ED course. Plan   Admit to med/surg floor. Patient condition is stable. New Medications     New Prescriptions    No medications on file     Electronically signed by ALVIN Viramontes   DD: 4/10/19  **This report was transcribed using voice recognition software. Every effort was made to ensure accuracy; however, inadvertent computerized transcription errors may be present.   END OF PROVIDER NOTE       Yanely Viramontes  04/10/19 120

## 2019-04-10 NOTE — ANESTHESIA POSTPROCEDURE EVALUATION
Department of Anesthesiology  Postprocedure Note    Patient: Chandrika Virk  MRN: [id number]  YOB: 1970  Date of evaluation: 4/10/2019  Time:  6:16 PM     Procedure Summary     Date:  04/10/19 Room / Location:  [facility]    Anesthesia Start:  1095 Anesthesia Stop:  8868    Procedure:  LAPAROSCOPIC APPENDECTOMY (N/A ) Diagnosis:  (/)    Surgeon:  Praveen Basilio MD Responsible Provider:  Liberty Neves MD    Anesthesia Type:  general ASA Status:  2          Anesthesia Type: general    Elbert Phase I: Elbert Score: 10    Elbert Phase II:      Last vitals: Reviewed and per EMR flowsheets.        Anesthesia Post Evaluation    Patient location during evaluation: PACU  Patient participation: complete - patient participated  Level of consciousness: awake  Airway patency: patent  Nausea & Vomiting: no nausea and no vomiting  Complications: no  Cardiovascular status: hemodynamically stable  Respiratory status: acceptable  Hydration status: euvolemic

## 2019-04-10 NOTE — LETTER
[address]  Phone: [phone]        April 11, 2019     Patient: Scott   YOB: 1970   Date of Visit: 4/10/2019       To Whom It May Concern: It is my medical opinion that [place]  May return to work on 4/8/19  with light duty no carrying anything more than 15lbs. If the patient feels well he may return to work sooner if he is off of narcotics. May go to work with no restrictions starting 5/23/19     If you have any questions or concerns, please don't hesitate to call.     Sincerely,      Denice Villalta MD

## 2019-04-11 VITALS
HEIGHT: 78 IN | HEART RATE: 58 BPM | TEMPERATURE: 99.2 F | WEIGHT: 250 LBS | DIASTOLIC BLOOD PRESSURE: 68 MMHG | OXYGEN SATURATION: 93 % | RESPIRATION RATE: 20 BRPM | BODY MASS INDEX: 28.93 KG/M2 | SYSTOLIC BLOOD PRESSURE: 118 MMHG

## 2019-04-11 LAB
ANION GAP SERPL CALCULATED.3IONS-SCNC: 14 MMOL/L (ref 7–16)
BUN BLDV-MCNC: 7 MG/DL (ref 6–20)
CALCIUM SERPL-MCNC: 9 MG/DL (ref 8.6–10.2)
CHLORIDE BLD-SCNC: 104 MMOL/L (ref 98–107)
CO2: 21 MMOL/L (ref 22–29)
CREAT SERPL-MCNC: 0.8 MG/DL (ref 0.7–1.2)
GFR AFRICAN AMERICAN: >60
GFR NON-AFRICAN AMERICAN: >60 ML/MIN/1.73
GLUCOSE BLD-MCNC: 118 MG/DL (ref 74–99)
HCT VFR BLD CALC: 44.9 % (ref 37–54)
HEMOGLOBIN: 15.3 G/DL (ref 12.5–16.5)
MCH RBC QN AUTO: 30.5 PG (ref 26–35)
MCHC RBC AUTO-ENTMCNC: 34.1 % (ref 32–34.5)
MCV RBC AUTO: 89.6 FL (ref 80–99.9)
PDW BLD-RTO: 12.6 FL (ref 11.5–15)
PLATELET # BLD: 190 E9/L (ref 130–450)
PMV BLD AUTO: 11.1 FL (ref 7–12)
POTASSIUM REFLEX MAGNESIUM: 4 MMOL/L (ref 3.5–5)
RBC # BLD: 5.01 E12/L (ref 3.8–5.8)
SODIUM BLD-SCNC: 139 MMOL/L (ref 132–146)
WBC # BLD: 9.9 E9/L (ref 4.5–11.5)

## 2019-04-11 PROCEDURE — 6370000000 HC RX 637 (ALT 250 FOR IP): Performed by: STUDENT IN AN ORGANIZED HEALTH CARE EDUCATION/TRAINING PROGRAM

## 2019-04-11 PROCEDURE — 2580000003 HC RX 258: Performed by: STUDENT IN AN ORGANIZED HEALTH CARE EDUCATION/TRAINING PROGRAM

## 2019-04-11 PROCEDURE — 6360000002 HC RX W HCPCS: Performed by: STUDENT IN AN ORGANIZED HEALTH CARE EDUCATION/TRAINING PROGRAM

## 2019-04-11 PROCEDURE — 99024 POSTOP FOLLOW-UP VISIT: CPT | Performed by: SURGERY

## 2019-04-11 PROCEDURE — 36415 COLL VENOUS BLD VENIPUNCTURE: CPT

## 2019-04-11 PROCEDURE — 80048 BASIC METABOLIC PNL TOTAL CA: CPT

## 2019-04-11 PROCEDURE — 85027 COMPLETE CBC AUTOMATED: CPT

## 2019-04-11 RX ADMIN — OXYCODONE HYDROCHLORIDE 10 MG: 10 TABLET ORAL at 13:31

## 2019-04-11 RX ADMIN — OXYCODONE HYDROCHLORIDE 10 MG: 10 TABLET ORAL at 08:03

## 2019-04-11 RX ADMIN — OXYCODONE HYDROCHLORIDE 10 MG: 10 TABLET ORAL at 02:45

## 2019-04-11 RX ADMIN — SODIUM CHLORIDE, POTASSIUM CHLORIDE, SODIUM LACTATE AND CALCIUM CHLORIDE: 600; 310; 30; 20 INJECTION, SOLUTION INTRAVENOUS at 05:46

## 2019-04-11 RX ADMIN — ENOXAPARIN SODIUM 40 MG: 40 INJECTION SUBCUTANEOUS at 08:03

## 2019-04-11 RX ADMIN — PANTOPRAZOLE SODIUM 40 MG: 40 TABLET, DELAYED RELEASE ORAL at 08:03

## 2019-04-11 RX ADMIN — Medication 10 ML: at 08:03

## 2019-04-11 ASSESSMENT — PAIN DESCRIPTION - PAIN TYPE
TYPE: SURGICAL PAIN

## 2019-04-11 ASSESSMENT — PAIN DESCRIPTION - LOCATION
LOCATION: ABDOMEN

## 2019-04-11 ASSESSMENT — PAIN SCALES - GENERAL
PAINLEVEL_OUTOF10: 10
PAINLEVEL_OUTOF10: 7
PAINLEVEL_OUTOF10: 5
PAINLEVEL_OUTOF10: 0
PAINLEVEL_OUTOF10: 7
PAINLEVEL_OUTOF10: 10

## 2019-04-11 ASSESSMENT — PAIN DESCRIPTION - DESCRIPTORS
DESCRIPTORS: ACHING;DISCOMFORT;PRESSURE
DESCRIPTORS: ACHING;DISCOMFORT;DULL
DESCRIPTORS: OTHER (COMMENT)

## 2019-04-11 ASSESSMENT — PAIN DESCRIPTION - FREQUENCY
FREQUENCY: INTERMITTENT
FREQUENCY: INTERMITTENT

## 2019-04-11 ASSESSMENT — PAIN DESCRIPTION - PROGRESSION
CLINICAL_PROGRESSION: NOT CHANGED
CLINICAL_PROGRESSION: NOT CHANGED

## 2019-04-11 ASSESSMENT — PAIN DESCRIPTION - ONSET
ONSET: ON-GOING
ONSET: ON-GOING

## 2019-04-11 NOTE — PROGRESS NOTES
[facility]  ATTENDING PHYSICIAN SURGERY PROGRESS NOTE     I have examined the patient, reviewed the record, and discussed the case with the APN/  Resident. I have reviewed all relevant labs and imaging data. The following summarizes my clinical findings and independent assessment. Chief Complaint   Patient presents with    Abdominal Pain     RLQ ABD PAIN SINCE YESTERDAY; + NAUSEA, DENIES EMESIS/DIARRHEA       S: Patient resting comfortably. NAD. Pain well controlled. Tolerating liquids     O:  Physical Exam   Constitutional: He is oriented to person, place, and time. He appears well-developed and well-nourished. HENT:   Head: Normocephalic. Eyes: EOM are normal.   Neck: Neck supple. Cardiovascular: Normal rate. Pulmonary/Chest: Effort normal. No respiratory distress. Abdominal: Soft. He exhibits no distension. Appropriately tender, Incisions c/d/i    Neurological: He is alert and oriented to person, place, and time. Skin: Skin is warm. Psychiatric: He has a normal mood and affect. Assessment   Active Problems:    Appendicitis  Resolved Problems:    * No resolved hospital problems.  *      Plan   Regular diet   Pain control   Hep lock   OT/PT    Aggressive pulmonary hygiene   No indication for abx   No indication for transfusion   PCDs, Lovenox   PIV  No cardia issues       Dispo: DC today as long as tolerating a diet     David Lynn MD

## 2019-04-11 NOTE — PROGRESS NOTES
Spoke with pt about discharge order, communicate with pt that if he tolerates lunch pt may be discharged this after noon, pt had no problems with general diet for breakfast, pt has no concerns at this time     Pt stated he tolerated lunch with out any problems, is passing gas, pt stated he would call wife for ride home

## 2019-04-11 NOTE — PROGRESS NOTES
C/C: Acute appendicitis s/p lap appendectomy     The patient is awake and alert. Denies fever, chills    Abdomen pain improving   Afebrile     Current Facility-Administered Medications   Medication Dose Route Frequency Provider Last Rate Last Dose    sodium chloride flush 0.9 % injection 10 mL  10 mL Intravenous 2 times per day Serge Mayes MD   10 mL at 04/11/19 0803    sodium chloride flush 0.9 % injection 10 mL  10 mL Intravenous PRN Serge Mayes MD        acetaminophen (TYLENOL) tablet 650 mg  650 mg Oral Q4H PRN Serge Mayes MD        enoxaparin (LOVENOX) injection 40 mg  40 mg Subcutaneous Daily Serge Mayes MD   40 mg at 04/11/19 0803    oxyCODONE (ROXICODONE) immediate release tablet 5 mg  5 mg Oral Q4H PRN Serge Mayes MD        Or    oxyCODONE HCl (OXY-IR) immediate release tablet 10 mg  10 mg Oral Q4H PRN Serge Mayes MD   10 mg at 04/11/19 1331    morphine (PF) injection 2 mg  2 mg Intravenous Q3H PRN Serge Mayes MD        pantoprazole (PROTONIX) tablet 40 mg  40 mg Oral Daily Serge Mayes MD   40 mg at 04/11/19 0803    ondansetron (ZOFRAN) injection 4 mg  4 mg Intravenous Q6H PRN Serge Mayes MD        morphine (PF) injection 2 mg  2 mg Intravenous Q5 Min PRN Basilio Navarro MD        HYDROmorphone (DILAUDID) injection 0.5 mg  0.5 mg Intravenous Q5 Min PRN Basilio Navarro MD        meperidine (DEMEROL) injection 12.5 mg  12.5 mg Intravenous Q5 Min PRN Basilio Navarro MD               REVIEW OF SYSTEMS:      CONSTITUTIONAL:  Denies fever, chill or rigors, nausea or vomiting. HEENT: denies blurring of vision or double vision, denies hearing problem  RESPIRATORY: denies cough, shortness of breath, sputum expectoration, chest pain.   CARDIOVASCULAR:  Denies palpitation  GASTROINTESTINAL: Abdomen pain - improving  GENITOURINARY:  Denies burning urination or frequency of urination  INTEGUMENT: denies wound , rash  HEMATOLOGIC/LYMPHATIC:  Denies lymph node swelling, gum bleeding or easy bruising. MUSCULOSKELETAL:  Denies leg pain , joint pain , joint swelling  NEUROLOGICAL:  Denies light headed, dizziness, loss of consciousness, weakness of lower extremities, bowel or bladder incontinence. Objective:    Vitals:    04/11/19 0745   BP: 118/68   Pulse: 58   Resp: 20   Temp: 99.2 °F (37.3 °C)   SpO2: 93%       General Appearance:    Awake, alert , no acute distress. Head:    Normocephalic, atraumatic   Eyes:    No pallor, no icterus,   Ears:    No obvious deformity or drainage.    Nose:   No nasal drainage   Throat:   Mucosa moist, no oral thrush   Neck:   Supple, no lymphadenopathy   Back:     no CVA tenderness   Lungs:     Clear to auscultation bilaterally, no wheeze    Heart:    Regular rate and rhythm, no murmur, rub or gallop   Abdomen:     Soft,+ tender lower abdomen , bowel sounds present    Extremities:   No edema, no cyanosis ,no open wound,no erythema, no     tenderness   Pulses:   Dorsalis pedis palpable    Skin:   no rashes or lesions        CBC with Differential:      Lab Results   Component Value Date    WBC 9.9 04/11/2019    RBC 5.01 04/11/2019    HGB 15.3 04/11/2019    HCT 44.9 04/11/2019     04/11/2019    MCV 89.6 04/11/2019    MCH 30.5 04/11/2019    MCHC 34.1 04/11/2019    RDW 12.6 04/11/2019    LYMPHOPCT 18.3 04/10/2019    MONOPCT 6.1 04/10/2019    BASOPCT 0.6 04/10/2019    MONOSABS 0.59 04/10/2019    LYMPHSABS 1.78 04/10/2019    EOSABS 0.30 04/10/2019    BASOSABS 0.06 04/10/2019       CMP:    Lab Results   Component Value Date     04/11/2019    K 4.0 04/11/2019     04/11/2019    CO2 21 04/11/2019    BUN 7 04/11/2019    CREATININE 0.8 04/11/2019    GFRAA >60 04/11/2019    LABGLOM >60 04/11/2019    GLUCOSE 118 04/11/2019    PROT 7.5 04/10/2019    LABALBU 4.3 04/10/2019    CALCIUM 9.0 04/11/2019    BILITOT 0.9 04/10/2019    ALKPHOS 65 04/10/2019    AST 20 04/10/2019    ALT 25 04/10/2019       Hepatic Function Panel:    Lab Results

## 2019-04-11 NOTE — PLAN OF CARE
Problem: Physical Regulation:  Goal: Will remain free from infection  Description  Will remain free from infection  Outcome: Met This Shift  Goal: Ability to maintain vital signs within normal range will improve  Description  Ability to maintain vital signs within normal range will improve  Outcome: Met This Shift  Goal: Complications related to the disease process, condition or treatment will be avoided or minimized  Description  Complications related to the disease process, condition or treatment will be avoided or minimized  Outcome: Met This Shift     Problem: Respiratory:  Goal: Ability to maintain normal respiratory secretions will improve  Description  Ability to maintain normal respiratory secretions will improve  Outcome: Met This Shift     Problem: Skin Integrity:  Goal: Demonstration of wound healing without infection will improve  Description  Demonstration of wound healing without infection will improve  Outcome: Met This Shift  Goal: Complications related to intravenous access or infusion will be avoided or minimized  Description  Complications related to intravenous access or infusion will be avoided or minimized  Outcome: Met This Shift     Problem: Activity:  Goal: Risk for activity intolerance will decrease  Description  Risk for activity intolerance will decrease  Outcome: Met This Shift     Problem:  Bowel/Gastric:  Goal: Bowel function will improve  Description  Bowel function will improve  Outcome: Met This Shift  Goal: Occurrences of nausea will decrease  Description  Occurrences of nausea will decrease  Outcome: Met This Shift  Goal: Occurrences of vomiting will decrease  Description  Occurrences of vomiting will decrease  Outcome: Met This Shift     Problem: Fluid Volume:  Goal: Maintenance of adequate hydration will improve  Description  Maintenance of adequate hydration will improve  Outcome: Met This Shift     Problem: Health Behavior:  Goal: Ability to state signs and symptoms to report to health care provider will improve  Description  Ability to state signs and symptoms to report to health care provider will improve  Outcome: Met This Shift     Problem: Sensory:  Goal: Ability to identify factors that increase the pain will improve  Description  Ability to identify factors that increase the pain will improve  Outcome: Met This Shift  Goal: Ability to notify healthcare provider of pain before it becomes unmanageable or unbearable will improve  Description  Ability to notify healthcare provider of pain before it becomes unmanageable or unbearable will improve  Outcome: Met This Shift  Goal: Pain level will decrease  Description  Pain level will decrease  Outcome: Met This Shift

## 2019-04-11 NOTE — DISCHARGE SUMMARY
No gross abnormalities. Lungs:  Normal expansion. Heart:  Heart regular rate   Abdomen:  Soft, approprietly tender, incisions C/D/I        Disposition: home    In process/preliminary results:  Outstanding Order Results     Date and Time Order Name Status Description    4/11/2019 8631 Basic Metabolic Panel w/ Reflex to MG In process           Patient Instructions:   Current Discharge Medication List           Details   oxyCODONE-acetaminophen (PERCOCET) 5-325 MG per tablet Take 1 tablet by mouth every 6 hours as needed for Pain for up to 3 days. Intended supply: 3 days. Take lowest dose possible to manage pain  Qty: 12 tablet, Refills: 0    Comments: Reduce doses taken as pain becomes manageable  Associated Diagnoses: S/P laparoscopic appendectomy              Details   albuterol sulfate  (90 Base) MCG/ACT inhaler Inhale 2 puffs into the lungs every 6 hours as needed for Wheezing      fluticasone (FLONASE) 50 MCG/ACT nasal spray 1 spray by Each Nare route daily as needed for Rhinitis      cetirizine (ZYRTEC) 10 MG tablet Take 10 mg by mouth daily as needed for Allergies             Dr. Zoie Wells Discharge Instuctions    · Discharge Home. · Call office to schedule follow-up appointment in 2 weeks    · Diet: Advance your diet as tolerated    · Activity: Increase activity gradually. No heavy lifting or strenuous activity for 4 weeks. no driving while on prescription pain medication. · Shower/Bathing: Okay to shower in 24 hours. No tub bathing, swimming or soaking for 2 weeks    · Dressings/Splint: You have skin glue applied to your incisions. You do not need to apply anything over them. Avoid directly applying lotions, creams or oils to your incision. ·     · Drains: none    · Medications: Take as prescribed.        Follow up:   Wicho Figueroa MD  [address] [place] [id number]  [address] [place] [address] MD Gail  [address] [place] [address] [phone]    Schedule an appointment as soon as possible for a visit in 2 weeks         Signed:  Jennyfer Crowder MD  4/11/2019  6:23 AM

## 2019-04-15 ENCOUNTER — TELEPHONE (OUTPATIENT)
Dept: SURGERY | Age: 49
End: 2019-04-15

## 2019-05-01 ENCOUNTER — TELEPHONE (OUTPATIENT)
Dept: SURGERY | Age: 49
End: 2019-05-01

## 2019-05-08 ENCOUNTER — TELEPHONE (OUTPATIENT)
Dept: SURGERY | Age: 49
End: 2019-05-08

## 2019-05-09 NOTE — TELEPHONE ENCOUNTER
MA spoke with Dr Ramon He in regards. MA contacted patient to inform him. Patient informed that as long as he continues to abide to a 15lb weight limit restriction and isn't having any complications we don't have to see him. Patient informed that if anything does arise or if he has any questions at all to call the office at [phone].     Electronically signed by Mann Velazquez on 5/9/19 at 3:24 PM

## 2020-02-20 ENCOUNTER — HOSPITAL ENCOUNTER (OUTPATIENT)
Dept: MRI IMAGING | Age: 50
Discharge: HOME | End: 2020-02-22
Payer: COMMERCIAL

## 2020-02-20 PROCEDURE — 72141 MRI NECK SPINE W/O DYE: CPT

## 2020-04-16 ENCOUNTER — VIRTUAL VISIT (OUTPATIENT)
Dept: PAIN MANAGEMENT | Age: 50
End: 2020-04-16
Payer: COMMERCIAL

## 2020-04-16 PROBLEM — M47.812 CERVICAL SPONDYLOSIS: Status: ACTIVE | Noted: 2020-04-16

## 2020-04-16 PROBLEM — M54.12 CERVICAL RADICULOPATHY: Status: ACTIVE | Noted: 2020-04-16

## 2020-04-16 PROBLEM — M50.90 CERVICAL DISC DISORDER: Status: ACTIVE | Noted: 2020-04-16

## 2020-04-16 PROBLEM — M47.812 CERVICAL FACET JOINT SYNDROME: Status: ACTIVE | Noted: 2020-04-16

## 2020-04-16 PROCEDURE — 99204 OFFICE O/P NEW MOD 45 MIN: CPT | Performed by: PAIN MEDICINE

## 2020-04-16 RX ORDER — GABAPENTIN 100 MG/1
200 CAPSULE ORAL 2 TIMES DAILY
Qty: 120 CAPSULE | Refills: 0 | Status: SHIPPED
Start: 2020-04-16 | End: 2020-05-14 | Stop reason: SDUPTHER

## 2020-04-16 RX ORDER — TIZANIDINE 2 MG/1
2 TABLET ORAL 2 TIMES DAILY PRN
Qty: 60 TABLET | Refills: 0 | Status: SHIPPED
Start: 2020-04-16 | End: 2020-05-14 | Stop reason: SDUPTHER

## 2020-04-16 NOTE — PROGRESS NOTES
Ivory Kraft was read the following message We want to confirm that, for purposes of billing, this is a virtual visit with your provider for which we will submit a claim for reimbursement with your insurance company. You will be responsible for any copays, coinsurance amounts or other amounts not covered by your insurance company. If you do not accept this, unfortunately we will not be able to schedule a virtual visit with the provider. Do you accept? Ivory responded Yes .

## 2020-05-14 ENCOUNTER — VIRTUAL VISIT (OUTPATIENT)
Dept: PAIN MANAGEMENT | Age: 50
End: 2020-05-14
Payer: COMMERCIAL

## 2020-05-14 PROCEDURE — 99212 OFFICE O/P EST SF 10 MIN: CPT | Performed by: PAIN MEDICINE

## 2020-05-14 RX ORDER — TIZANIDINE 2 MG/1
2 TABLET ORAL 2 TIMES DAILY PRN
Qty: 60 TABLET | Refills: 0 | Status: SHIPPED | OUTPATIENT
Start: 2020-05-14 | End: 2020-06-13

## 2020-05-14 RX ORDER — GABAPENTIN 300 MG/1
300 CAPSULE ORAL 2 TIMES DAILY
Qty: 60 CAPSULE | Refills: 0 | Status: SHIPPED
Start: 2020-05-14 | End: 2020-06-15 | Stop reason: SDUPTHER

## 2020-05-14 NOTE — PROGRESS NOTES
Drug-Related Behavior:   No     Urine Drug Screening:  No    OARRS report:  05/2020 consistent     Past Medical History: Reviewed    Past Surgical History: Reviewed     Home Medications: Reviewed    Allergies: Reviewed     Social History: Reviewed     REVIEW OF SYSTEMS:     Ivory denies fever/chills, chest pain, shortness of breath, new bowel or bladder complaints. All other review of systems was negative. PHYSICAL EXAMINATION:      General:       A & O x3    HEENT:    Head:normocephalic and atraumatic  Sclera: icterus absent,     Lungs:    Breathing:Breathing Pattern: regular, no distress    Cervical spine:    Inspection:normal  Range of motion:abnormal moderately flexion, extension rotation left and is  painful. Musculoskeletal:    SLR:not done right, not done left, sitting     Extremities:    Tremors:None bilaterally upper and lower  Intact:Yes    Neurological:     Motor:          No apparent weakness per patient         Dermatology:    Skin:no unusual rashes and no skin lesions    Impression:    Chronic neck pain with radiation to the Left shoulder with numbness in the left arm and hand  Plan:  Follow up on his neck pain with radiation to the Left shoulder with complaints of worsening pain. Patient reports no fever or shortness of breath. Re-discussed cervical epidural steroid injection. Patient mentioned that he wants to proceed but he wants to discuss it with his wife first.   Continue with Gabapentin but will change to 300 mg BID. Continue with Zanaflex 2 mg BID PRN  Will consider second opinion neurosurgery consult if his pain continues to worsen. OARRS report reviewed 05/2020. Patient encouraged to stay active and to lose weight. Treatment plan discussed with the patient including medications and procedure side effects. We discussed with the patient that combining opioids, benzodiazepines, alcohol, illicit drugs or sleep aids increases the risk of respiratory depression including death.  We

## 2020-06-09 ENCOUNTER — VIRTUAL VISIT (OUTPATIENT)
Dept: PAIN MANAGEMENT | Age: 50
End: 2020-06-09
Payer: COMMERCIAL

## 2020-06-09 PROCEDURE — 99213 OFFICE O/P EST LOW 20 MIN: CPT | Performed by: PAIN MEDICINE

## 2020-06-15 RX ORDER — GABAPENTIN 300 MG/1
300 CAPSULE ORAL 3 TIMES DAILY
Qty: 90 CAPSULE | Refills: 0 | Status: SHIPPED | OUTPATIENT
Start: 2020-06-15 | End: 2020-07-15

## 2020-06-15 NOTE — TELEPHONE ENCOUNTER
[id number] called into the office requesting a refill of Neurontin. It was last filled on 5/14/2020 for a 30 day supply. Their last virtual visit was on 6/9/2020. Their next office visit is scheduled for 7/8/2020. The reason they need a refill before their next appointment is because did not get at last visit.

## 2020-08-19 ENCOUNTER — TELEPHONE (OUTPATIENT)
Dept: PAIN MANAGEMENT | Age: 50
End: 2020-08-19

## 2020-08-19 NOTE — TELEPHONE ENCOUNTER
Patient phone in today stating he is having side effects from the gabapentin and has choose to quit taking the medication.

## 2024-07-04 ENCOUNTER — HOSPITAL ENCOUNTER (EMERGENCY)
Age: 54
Discharge: HOME | End: 2024-07-04
Payer: COMMERCIAL

## 2024-07-04 VITALS
BODY MASS INDEX: 30.39 KG/M2 | SYSTOLIC BLOOD PRESSURE: 148 MMHG | WEIGHT: 263 LBS | OXYGEN SATURATION: 95 % | RESPIRATION RATE: 18 BRPM | HEART RATE: 65 BPM | TEMPERATURE: 98.1 F | DIASTOLIC BLOOD PRESSURE: 92 MMHG

## 2024-07-04 DIAGNOSIS — S05.02XA ABRASION OF LEFT CORNEA, INITIAL ENCOUNTER: Primary | ICD-10-CM

## 2024-07-04 PROCEDURE — 6370000000 HC RX 637 (ALT 250 FOR IP): Performed by: NURSE PRACTITIONER

## 2024-07-04 PROCEDURE — 99283 EMERGENCY DEPT VISIT LOW MDM: CPT

## 2024-07-04 RX ORDER — VENLAFAXINE 75 MG/1
75 TABLET ORAL DAILY
COMMUNITY

## 2024-07-04 RX ORDER — CIPROFLOXACIN HYDROCHLORIDE 3.5 MG/ML
2 SOLUTION/ DROPS TOPICAL ONCE
Status: COMPLETED | OUTPATIENT
Start: 2024-07-04 | End: 2024-07-04

## 2024-07-04 RX ORDER — TETRACAINE HYDROCHLORIDE 5 MG/ML
2 SOLUTION OPHTHALMIC ONCE
Status: COMPLETED | OUTPATIENT
Start: 2024-07-04 | End: 2024-07-04

## 2024-07-04 RX ORDER — CIPROFLOXACIN HYDROCHLORIDE 3.5 MG/ML
2 SOLUTION/ DROPS TOPICAL
Qty: 10 ML | Refills: 0 | Status: SHIPPED | OUTPATIENT
Start: 2024-07-04 | End: 2024-07-09

## 2024-07-04 RX ADMIN — CIPROFLOXACIN 2 DROP: 3 SOLUTION OPHTHALMIC at 16:41

## 2024-07-04 RX ADMIN — FLUORESCEIN SODIUM 1 MG: 1 STRIP OPHTHALMIC at 16:36

## 2024-07-04 RX ADMIN — TETRACAINE HYDROCHLORIDE 2 DROP: 5 SOLUTION OPHTHALMIC at 16:36

## 2024-07-04 ASSESSMENT — VISUAL ACUITY
OU: 20/15
OD: 20/15
OS: 20/70

## 2024-07-04 ASSESSMENT — PAIN DESCRIPTION - PAIN TYPE: TYPE: ACUTE PAIN

## 2024-07-04 ASSESSMENT — PAIN DESCRIPTION - DESCRIPTORS: DESCRIPTORS: BURNING;DISCOMFORT;TENDER

## 2024-07-04 ASSESSMENT — PAIN DESCRIPTION - LOCATION: LOCATION: EYE

## 2024-07-04 ASSESSMENT — PAIN - FUNCTIONAL ASSESSMENT: PAIN_FUNCTIONAL_ASSESSMENT: 0-10

## 2024-07-04 ASSESSMENT — PAIN DESCRIPTION - ONSET: ONSET: SUDDEN

## 2024-07-04 ASSESSMENT — PAIN DESCRIPTION - ORIENTATION: ORIENTATION: LEFT

## 2024-07-04 ASSESSMENT — PAIN DESCRIPTION - FREQUENCY: FREQUENCY: CONTINUOUS

## 2024-07-04 ASSESSMENT — PAIN SCALES - GENERAL: PAINLEVEL_OUTOF10: 7

## 2024-07-04 NOTE — ED PROVIDER NOTES
[facility]  Department of Emergency Medicine   ED  Encounter Note  Admit Date/RoomTime: 2024  3:36 PM  ED Room:     NAME: Dario Kraft  : 1970  MRN: [id number]     Chief Complaint:  Eye Pain (Kicked in left eye by his dog.)    History of Present Illness        Dario Kraft is a 53 y.o. old male presenting to the emergency department by private vehicle, for traumatic sudden onset erythema and photophobia to left eye, which began a few minute(s) prior to arrival.  He states he was playing with his dog and the dog's paw hit him in the eye.  Since onset his symptoms have been stable and moderate in severity. Associated signs & symptoms of: nothing additional. The patients tetanus status is up to date.  He wears glasses and contact lenses.    ROS   Pertinent positives and negatives are stated within HPI, all other systems reviewed and are negative.    Past Medical History:  has a past medical history of Diverticulitis, Hypotension, Neck pain, and Syncope and collapse.    Surgical History:  has a past surgical history that includes back surgery; hernia repair; other surgical history (2012); and laparoscopic appendectomy (N/A, 4/10/2019).    Social History:  reports that he has quit smoking. He started smoking about 4 years ago. He has a 10.0 pack-year smoking history. He has never used smokeless tobacco. He reports current alcohol use. He reports that he does not use drugs.    Family History: family history includes Cancer in his father and mother; Diabetes in his mother.     Allergies: Other and Penicillins    Physical Exam   Oxygen Saturation Interpretation: Normal.        ED Triage Vitals   BP Temp Temp Source Pulse Respirations SpO2 Height Weight - Scale   24 1541 24 1541 24 1541 24 1541 24 1541 24 1541 -- 24 1538   (!) 148/92 98.1 °F (36.7 °C) Oral 65 18 95 %  119.3 kg (263 lb)       Physical Exam  Constitutional:  Alert,

## (undated) DEVICE — STERILE LATEX POWDER FREE SURGICAL GLOVES WITH HYDROGEL COATING: Brand: PROTEXIS

## (undated) DEVICE — TOTAL TRAY, DB, 100% SILI FOLEY, 16FR 10: Brand: MEDLINE

## (undated) DEVICE — RELOAD STPL SZ 0 L45MM DIA3.5MM 0DEG STD REG TISS BLU TI

## (undated) DEVICE — SET INSTRUMENT LAP I

## (undated) DEVICE — INTENDED FOR TISSUE SEPARATION, AND OTHER PROCEDURES THAT REQUIRE A SHARP SURGICAL BLADE TO PUNCTURE OR CUT.: Brand: BARD-PARKER ® STAINLESS STEEL BLADES

## (undated) DEVICE — NEEDLE CLOSURE OMNICLOSE

## (undated) DEVICE — 1.5L THIN WALL CAN: Brand: CRD

## (undated) DEVICE — INSUFFLATION TUBING SET WITH FILTER, FUNNEL CONNECTOR AND LUER LOCK: Brand: JOSNOE MEDICAL INC

## (undated) DEVICE — SKIN AFFIX SURG ADHESIVE 72/CS 0.55ML: Brand: MEDLINE

## (undated) DEVICE — AGENT HEMSTAT W2XL4IN OXIDIZED REGENERATED CELOS ABSRB

## (undated) DEVICE — Z DISCONTINUED NO SUB IDED BAG SPEC RETRV M C240ML MOUTH 7.3MM L17CM SHFT 10MM NYL EZEE

## (undated) DEVICE — KIT,ANTI FOG,W/SPONGE & FLUID,SOFT PACK: Brand: MEDLINE

## (undated) DEVICE — SYRINGE MED 20ML STD CLR PLAS LUERLOCK TIP N CTRL DISP

## (undated) DEVICE — TROCAR ENDOSCP L100MM DIA5MM BLDELSS STBL SL OBT RADLUC

## (undated) DEVICE — PMI DISPOSABLE PUNCTURE CLOSURE DEVICE / SUTURE GRASPER: Brand: PMI

## (undated) DEVICE — GLOVE ORANGE PI 7   MSG9070

## (undated) DEVICE — SYRINGE MED 10ML LUERLOCK TIP W/O SFTY DISP

## (undated) DEVICE — SET ENDO INSTR LAPAROSCOPIC STGENLAP

## (undated) DEVICE — TIBURON GENERAL ENDOSCOPY DRAPE: Brand: CONVERTORS

## (undated) DEVICE — CHLORAPREP 26ML ORANGE

## (undated) DEVICE — NEEDLE INSUF L120MM DIA2MM DISP FOR PNEUMOPERI ENDOPATH

## (undated) DEVICE — RELOAD STPL H1-2.5X45MM VASC THN TISS WHT 6 ROW B FRM SGL

## (undated) DEVICE — CAMERA STRYKER 1488 HD GEN

## (undated) DEVICE — SURGICAL PROCEDURE PACK BASIC

## (undated) DEVICE — LAPAROSCOPIC SCISSORS: Brand: EPIX LAPAROSCOPIC SCISSORS

## (undated) DEVICE — TOWEL,OR,DSP,ST,BLUE,STD,6/PK,12PK/CS: Brand: MEDLINE

## (undated) DEVICE — Z INACTIVE USE 2660664 SOLUTION IRRIG 3000ML 0.9% SOD CHL USP UROMATIC PLAS CONT

## (undated) DEVICE — STANDARD HYPODERMIC NEEDLE,POLYPROPYLENE HUB: Brand: MONOJECT

## (undated) DEVICE — TROCAR ENDOSCP L100MM DIA12MM BLDELSS OBT RADLUC STBL SL

## (undated) DEVICE — CUTTER ENDOSCP L340MM LIN ARTC SGL STROKE FIRING ENDOPATH

## (undated) DEVICE — BLADE CLIPPER GEN PURP NS

## (undated) DEVICE — PATIENT RETURN ELECTRODE, SINGLE-USE, CONTACT QUALITY MONITORING, ADULT, WITH 9FT CORD, FOR PATIENTS WEIGING OVER 33LBS. (15KG): Brand: MEGADYNE